# Patient Record
Sex: MALE | URBAN - METROPOLITAN AREA
[De-identification: names, ages, dates, MRNs, and addresses within clinical notes are randomized per-mention and may not be internally consistent; named-entity substitution may affect disease eponyms.]

---

## 2023-02-09 ENCOUNTER — APPOINTMENT (RX ONLY)
Dept: URBAN - METROPOLITAN AREA CLINIC 100 | Facility: CLINIC | Age: 48
Setting detail: DERMATOLOGY
End: 2023-02-09

## 2023-02-09 DIAGNOSIS — I87.2 VENOUS INSUFFICIENCY (CHRONIC) (PERIPHERAL): ICD-10-CM

## 2023-02-09 PROCEDURE — ? PRESCRIPTION

## 2023-02-09 PROCEDURE — ? MEDICAL CONSULTATION: VENOUS DISEASE

## 2023-02-09 PROCEDURE — 99203 OFFICE O/P NEW LOW 30 MIN: CPT

## 2023-02-09 RX ORDER — MULTIVITAMIN/IRON/FOLIC ACID 18MG-0.4MG
TABLET ORAL
Qty: 1 | Refills: 0 | Status: ACTIVE

## 2023-02-09 NOTE — PROCEDURE: MEDICAL CONSULTATION: VENOUS DISEASE
Left Leg Active Ulcers: 0- None
Left Dorsalis Pedis Pulse: 2 (Easily palpable)
Right Leg Varicose Veins: 2- Multiple: GS varicose veins confined to calf or thigh
Include Left Vcss In Note: Yes
Right Leg Venous Hyperpigmentation: 0- None or focal low intensity (tan)
Right Leg: Peripheral Vascular Disease?: No
Right Leg Circumference: medium
Right Leg Compression Therapy: 3- Full compliance: stockings and elevation
Left Leg Pain: 2- Daily, moderate activity limitation, occasional analgesics
Detail Level: Detailed
Follow Up Instructions:: Patient will follow up after the bilateral duplex ultrasound venous reflux study to review the results and finalize treatment plan. The patient must wear compression stockings. Preventive strategies include weight loss through diet and exercise and toning leg muscles. A venous fact sheet was given, which reviews venous anatomy/pathophysiology and treatment options. The pathophysiology of venous disease and potential treatment options were discussed in detail, especially the non-FDA status of foam sclerotherapy with its risks benefits and alternatives. The patient's questions were answered in full.

## 2023-02-09 NOTE — HPI: VEIN EVALUATION
Do You Have A Family History Of Vein Disease?: yes
How Severe Is/Are Your Symptoms?: moderate
Is This A New Presentation, Or A Follow-Up?: Lower Extremity Pain
Family History Of Vein Disease (Include Family Member And Type Of Vein Disease):: Parents
Referred By:: Aleksey Cuello

## 2023-03-01 ENCOUNTER — APPOINTMENT (RX ONLY)
Dept: URBAN - METROPOLITAN AREA CLINIC 100 | Facility: CLINIC | Age: 48
Setting detail: DERMATOLOGY
End: 2023-03-01

## 2023-03-01 DIAGNOSIS — I87.2 VENOUS INSUFFICIENCY (CHRONIC) (PERIPHERAL): ICD-10-CM

## 2023-03-01 PROCEDURE — 93970 EXTREMITY STUDY: CPT

## 2023-03-01 PROCEDURE — ? LOWER EXTREMITY DOPPLER US

## 2023-03-01 NOTE — PROCEDURE: LOWER EXTREMITY DOPPLER US
Use - 'see Attached Documentation' Verbiage?: No
Size Options: Use Range
Continue Conservative Therapy: Yes
Right Intraluminal Thrombus- Yes: The right deep veins were imaged from the level of the common femoral vein to the posterior tibial veins. There was evidence of intraluminal thrombus as noted above.
Comments: See attachment.
See Attached Documentation Text: Please refer to the attached ultrasound documentation for complete details of the procedure and the venous findings.
Detail Level: Detailed
Continue Conservative Therapy Text: Continue conservative treatment (such as compression stockings, OTC analgesics, and exercise) and consider intervention if no change or worsening symptoms to varicosities.
Left Intraluminal Thrombus- No: The left deep veins were imaged from the level of the common femoral vein to the posterior tibial veins. All deep veins demonstrated compressibility without evidence of intraluminal thrombus.
Left Intraluminal Thrombus- Yes: The left deep veins were imaged from the level of the common femoral vein to the posterior tibial veins. There was evidence of intraluminal thrombus as noted above.
Right Intraluminal Thrombus- No: The right deep veins were imaged from the level of the common femoral vein to the posterior tibial veins. All deep veins demonstrated compressibility without evidence of intraluminal thrombus.

## 2023-03-03 ENCOUNTER — APPOINTMENT (RX ONLY)
Dept: URBAN - METROPOLITAN AREA CLINIC 92 | Facility: CLINIC | Age: 48
Setting detail: DERMATOLOGY
End: 2023-03-03

## 2023-03-03 DIAGNOSIS — Z41.9 ENCOUNTER FOR PROCEDURE FOR PURPOSES OTHER THAN REMEDYING HEALTH STATE, UNSPECIFIED: ICD-10-CM

## 2023-03-03 PROCEDURE — ? COSMETIC CONSULTATION: SCLEROTHERAPY

## 2023-03-03 ASSESSMENT — LOCATION DETAILED DESCRIPTION DERM
LOCATION DETAILED: LEFT PROXIMAL PRETIBIAL REGION
LOCATION DETAILED: LEFT MEDIAL PROXIMAL PRETIBIAL REGION
LOCATION DETAILED: LEFT DISTAL PRETIBIAL REGION

## 2023-03-03 ASSESSMENT — LOCATION SIMPLE DESCRIPTION DERM: LOCATION SIMPLE: LEFT PRETIBIAL REGION

## 2023-03-03 ASSESSMENT — LOCATION ZONE DERM: LOCATION ZONE: LEG

## 2023-06-07 ENCOUNTER — APPOINTMENT (RX ONLY)
Dept: URBAN - METROPOLITAN AREA CLINIC 92 | Facility: CLINIC | Age: 48
Setting detail: DERMATOLOGY
End: 2023-06-07

## 2023-06-07 DIAGNOSIS — I87.2 VENOUS INSUFFICIENCY (CHRONIC) (PERIPHERAL): ICD-10-CM

## 2023-06-07 PROCEDURE — ? CEAP CLASSIFICATION

## 2023-06-07 PROCEDURE — ? VEIN TREATMENT PLAN

## 2023-06-07 PROCEDURE — 99213 OFFICE O/P EST LOW 20 MIN: CPT

## 2023-06-07 PROCEDURE — ? VENOUS CLINICAL SEVERITY SCORE

## 2023-06-07 NOTE — HPI: VEIN EVALUATION
Do You Have A Family History Of Vein Disease?: yes
Which Leg Is Worse?: Left
Do You Have A Family History Of Bleeding Disorders?: no
How Severe Is/Are Your Symptoms?: moderate
Is This A New Presentation, Or A Follow-Up?: Follow Up Venous Evaluation

## 2023-06-07 NOTE — PROCEDURE: VEIN TREATMENT PLAN
Microphlebectomy Stabs - Left: 10-20
Ugs Sessions - Left: 2
Intro Statement (Will Not Render If Left Blank): Extensive discussion and education was undertaken during the office visit regarding pathophysiology, chronicity and long term prognosis of venous disease. We also discussed risk factors for venous hypertension, diagnostic testing and treatment. Our treatment discussion included conservative management and interventional procedure options with an in-depth explanation of the procedures, recommendations and expected follow-up. All questions were answered and no further questions were verbalized by the patient at this time.
Closing Statement (Will Not Render If Left Blank): We discussed all treatment options. Risks and benefits of each procedure were discussed. These include but are not limited to: deep vein thrombosis, pulmonary embolism, paresthesia, phlebitis, infection, bleeding, and visible scarring. After the risks and benefits were reviewed with the patient and all of their questions were answered they decided to move forward with treatment. A patient education booklet was given and pre/post procedure instructions were reviewed with the patient.
Detail Level: Detailed
Symptom Statement (Will Not Render If Left Blank): The patient has signs and symptoms of chronic venous hypertension affecting daily function with US of the lower extremities demonstrating venous insufficiency. The patient has failed conservative treatment including avoiding prolonged standing, leg elevation, analgesis, exercise, weight management and graduated compression stockings (20-30mmHg or higher).
Shoaib Sessions - Left: 1

## 2023-06-07 NOTE — PROCEDURE: VENOUS CLINICAL SEVERITY SCORE
Right Leg Induration: 0- None
Right Leg Pain: 1- Occasional, not restricting activity or requiring analgesics
Left Leg Compression Therapy: 0- None or noncompliant
Left Leg Venous Edema: 1- Evening ankle edema only
Right Leg Compression Therapy: 3- Full compliance: stockings and elevation
Include Right Vcss In Note: Yes
Left Leg Varicose Veins: 2- Multiple: GS varicose veins confined to calf or thigh
Right Leg Varicose Veins: 1- Few, scattered: branch varicose veins
Detail Level: Simple
Left Leg Pigmentation: 0- None or focal low intensity (tan)
Left Leg Pain: 2- Daily, moderate activity limitation, occasional analgesics

## 2025-04-10 ENCOUNTER — APPOINTMENT (OUTPATIENT)
Dept: URBAN - METROPOLITAN AREA CLINIC 103 | Facility: CLINIC | Age: 50
Setting detail: DERMATOLOGY
End: 2025-04-10

## 2025-04-10 DIAGNOSIS — I83.9 ASYMPTOMATIC VARICOSE VEINS OF LOWER EXTREMITIES: ICD-10-CM

## 2025-04-10 PROBLEM — I83.93 ASYMPTOMATIC VARICOSE VEINS OF BILATERAL LOWER EXTREMITIES: Status: ACTIVE | Noted: 2025-04-10

## 2025-04-10 PROCEDURE — ? RECOMMENDATIONS

## 2025-04-10 PROCEDURE — 99212 OFFICE O/P EST SF 10 MIN: CPT

## 2025-04-10 PROCEDURE — ? COUNSELING

## 2025-04-10 ASSESSMENT — LOCATION SIMPLE DESCRIPTION DERM
LOCATION SIMPLE: LEFT CALF
LOCATION SIMPLE: RIGHT CALF

## 2025-04-10 ASSESSMENT — LOCATION DETAILED DESCRIPTION DERM
LOCATION DETAILED: RIGHT DISTAL CALF
LOCATION DETAILED: LEFT PROXIMAL CALF

## 2025-04-10 ASSESSMENT — LOCATION ZONE DERM: LOCATION ZONE: LEG

## 2025-04-10 NOTE — PROCEDURE: RECOMMENDATIONS
Detail Level: Zone
Recommendation Preamble: The following recommendations were made during the visit: refer to vein specialist (Dr. Duran).
Render Risk Assessment In Note?: no